# Patient Record
Sex: MALE | Employment: FULL TIME | URBAN - METROPOLITAN AREA
[De-identification: names, ages, dates, MRNs, and addresses within clinical notes are randomized per-mention and may not be internally consistent; named-entity substitution may affect disease eponyms.]

---

## 2023-07-25 ENCOUNTER — OFFICE VISIT (OUTPATIENT)
Age: 33
End: 2023-07-25
Payer: COMMERCIAL

## 2023-07-25 VITALS
HEIGHT: 69 IN | WEIGHT: 173 LBS | BODY MASS INDEX: 25.62 KG/M2 | HEART RATE: 96 BPM | OXYGEN SATURATION: 99 % | TEMPERATURE: 97.1 F | SYSTOLIC BLOOD PRESSURE: 106 MMHG | DIASTOLIC BLOOD PRESSURE: 74 MMHG | RESPIRATION RATE: 16 BRPM

## 2023-07-25 DIAGNOSIS — B36.0 TINEA VERSICOLOR: ICD-10-CM

## 2023-07-25 DIAGNOSIS — Z00.00 WELL ADULT EXAM: Primary | ICD-10-CM

## 2023-07-25 DIAGNOSIS — K51.919 ULCERATIVE COLITIS WITH COMPLICATION, UNSPECIFIED LOCATION (HCC): ICD-10-CM

## 2023-07-25 DIAGNOSIS — Z11.59 ENCOUNTER FOR HEPATITIS C SCREENING TEST FOR LOW RISK PATIENT: ICD-10-CM

## 2023-07-25 DIAGNOSIS — Z11.4 ENCOUNTER FOR SCREENING FOR HIV: ICD-10-CM

## 2023-07-25 PROBLEM — K51.90 ULCERATIVE COLITIS (HCC): Status: ACTIVE | Noted: 2023-07-25

## 2023-07-25 PROCEDURE — 99385 PREV VISIT NEW AGE 18-39: CPT | Performed by: STUDENT IN AN ORGANIZED HEALTH CARE EDUCATION/TRAINING PROGRAM

## 2023-07-25 PROCEDURE — 99203 OFFICE O/P NEW LOW 30 MIN: CPT | Performed by: STUDENT IN AN ORGANIZED HEALTH CARE EDUCATION/TRAINING PROGRAM

## 2023-07-25 RX ORDER — PREDNISONE 50 MG/1
50 TABLET ORAL DAILY
Qty: 5 TABLET | Refills: 0 | Status: SHIPPED | OUTPATIENT
Start: 2023-07-25 | End: 2023-07-30

## 2023-07-25 RX ORDER — KETOCONAZOLE 20 MG/ML
SHAMPOO TOPICAL
Qty: 120 ML | Refills: 1 | Status: SHIPPED | OUTPATIENT
Start: 2023-07-25

## 2023-07-25 SDOH — ECONOMIC STABILITY: HOUSING INSECURITY
IN THE LAST 12 MONTHS, WAS THERE A TIME WHEN YOU DID NOT HAVE A STEADY PLACE TO SLEEP OR SLEPT IN A SHELTER (INCLUDING NOW)?: NO

## 2023-07-25 SDOH — ECONOMIC STABILITY: INCOME INSECURITY: HOW HARD IS IT FOR YOU TO PAY FOR THE VERY BASICS LIKE FOOD, HOUSING, MEDICAL CARE, AND HEATING?: NOT HARD AT ALL

## 2023-07-25 SDOH — ECONOMIC STABILITY: FOOD INSECURITY: WITHIN THE PAST 12 MONTHS, THE FOOD YOU BOUGHT JUST DIDN'T LAST AND YOU DIDN'T HAVE MONEY TO GET MORE.: NEVER TRUE

## 2023-07-25 SDOH — ECONOMIC STABILITY: FOOD INSECURITY: WITHIN THE PAST 12 MONTHS, YOU WORRIED THAT YOUR FOOD WOULD RUN OUT BEFORE YOU GOT MONEY TO BUY MORE.: NEVER TRUE

## 2023-07-25 ASSESSMENT — ENCOUNTER SYMPTOMS
PHOTOPHOBIA: 0
TROUBLE SWALLOWING: 0
DIARRHEA: 1
VOMITING: 0
WHEEZING: 0
BACK PAIN: 0
BLOOD IN STOOL: 0
SORE THROAT: 0
ABDOMINAL PAIN: 0
COLOR CHANGE: 1
SHORTNESS OF BREATH: 0
RHINORRHEA: 0
EYE DISCHARGE: 0
EYE ITCHING: 0
VOICE CHANGE: 0
NAUSEA: 0
CONSTIPATION: 0
COUGH: 0

## 2023-07-25 ASSESSMENT — PATIENT HEALTH QUESTIONNAIRE - PHQ9
SUM OF ALL RESPONSES TO PHQ QUESTIONS 1-9: 0
1. LITTLE INTEREST OR PLEASURE IN DOING THINGS: 0
SUM OF ALL RESPONSES TO PHQ QUESTIONS 1-9: 0
SUM OF ALL RESPONSES TO PHQ QUESTIONS 1-9: 0
SUM OF ALL RESPONSES TO PHQ9 QUESTIONS 1 & 2: 0
SUM OF ALL RESPONSES TO PHQ QUESTIONS 1-9: 0
2. FEELING DOWN, DEPRESSED OR HOPELESS: 0

## 2023-07-25 NOTE — PROGRESS NOTES
Chief Complaint   Patient presents with    Establish Care    Skin Problem     Discoloration to neck and shoulders bilaterally         1. \"Have you been to the ER, urgent care clinic since your last visit? Hospitalized since your last visit? \" No    2. \"Have you seen or consulted any other health care providers outside of the [address] [place] [address] since your last visit? \" No     3. For patients aged 43-73: Has the patient had a colonoscopy / FIT/ Cologuard?  No

## 2023-07-25 NOTE — PROGRESS NOTES
History and Physical      Dereje Broderick  YOB: 1990    Date of Service:  7/25/2023    Chief Complaint:   Lisa Orozco is a 28 y.o. male who presents for complete physical examination. HPI: Pleasant 77-year-old gentleman with a past medical history of ulcerative colitis who presents today to establish care and for CPE. Patient states he recently relocated from [place]. Patient endorses a prior history of substance use disorder. He states, that his relocation to [place] was in furtherance of breaking away from his prior use of illicit substances while in [place]. Patient also states he is scheduled to establish with a gastroenterologist (ulcerative colitis) with his upcoming appointment on 8/4/2024. Patient also complains of skin discoloration and itchiness on his neck, back and shoulder since May. Patient otherwise denies any acute medical issues. Wt Readings from Last 3 Encounters:   07/25/23 173 lb (78.5 kg)     BP Readings from Last 3 Encounters:   07/25/23 106/74       Patient Active Problem List   Diagnosis    Ulcerative colitis ([address])       No Known Allergies  Outpatient Medications Marked as Taking for the 7/25/23 encounter (Office Visit) with Sky Funes DO   Medication Sig Dispense Refill    PREDNISONE, ABDIEL, PO Take by mouth 40 mg taper      ketoconazole (NIZORAL) 2 % shampoo Apply topically daily as needed. 120 mL 1    predniSONE (DELTASONE) 50 MG tablet Take 1 tablet by mouth daily for 5 days 5 tablet 0       Past Medical History:   Diagnosis Date    Ulcerative colitis ([address])      No past surgical history on file.   Family History   Family history unknown: Yes     Social History     Socioeconomic History    Marital status: Single     Spouse name: Not on file    Number of children: Not on file    Years of education: Not on file    Highest education level: Not on file   Occupational History    Not on file   Tobacco Use    Smoking status: Some Days

## 2023-08-17 ENCOUNTER — TELEPHONE (OUTPATIENT)
Age: 33
End: 2023-08-17

## 2023-08-17 NOTE — TELEPHONE ENCOUNTER
Patient requesting refill on Prednisone if possible, states appt with gastro is not until 8/31 and that his colitis is flaring up

## 2024-03-18 ENCOUNTER — OFFICE VISIT (OUTPATIENT)
Age: 34
End: 2024-03-18
Payer: COMMERCIAL

## 2024-03-18 VITALS
TEMPERATURE: 97.3 F | HEIGHT: 69 IN | HEART RATE: 77 BPM | SYSTOLIC BLOOD PRESSURE: 119 MMHG | OXYGEN SATURATION: 98 % | DIASTOLIC BLOOD PRESSURE: 84 MMHG | RESPIRATION RATE: 18 BRPM | WEIGHT: 194 LBS | BODY MASS INDEX: 28.73 KG/M2

## 2024-03-18 DIAGNOSIS — D17.21 LIPOMA OF RIGHT UPPER EXTREMITY: Primary | ICD-10-CM

## 2024-03-18 DIAGNOSIS — L29.9 ITCHING: ICD-10-CM

## 2024-03-18 PROCEDURE — 99213 OFFICE O/P EST LOW 20 MIN: CPT | Performed by: STUDENT IN AN ORGANIZED HEALTH CARE EDUCATION/TRAINING PROGRAM

## 2024-03-18 RX ORDER — HYDROXYZINE HYDROCHLORIDE 25 MG/1
25 TABLET, FILM COATED ORAL EVERY 8 HOURS PRN
Qty: 45 TABLET | Refills: 0 | Status: SHIPPED | OUTPATIENT
Start: 2024-03-18 | End: 2024-04-02

## 2024-03-18 RX ORDER — UPADACITINIB 30 MG/1
TABLET, EXTENDED RELEASE ORAL
COMMUNITY
Start: 2023-11-20

## 2024-03-18 ASSESSMENT — PATIENT HEALTH QUESTIONNAIRE - PHQ9
SUM OF ALL RESPONSES TO PHQ QUESTIONS 1-9: 0
SUM OF ALL RESPONSES TO PHQ QUESTIONS 1-9: 0
1. LITTLE INTEREST OR PLEASURE IN DOING THINGS: NOT AT ALL
2. FEELING DOWN, DEPRESSED OR HOPELESS: NOT AT ALL
SUM OF ALL RESPONSES TO PHQ QUESTIONS 1-9: 0
SUM OF ALL RESPONSES TO PHQ9 QUESTIONS 1 & 2: 0
SUM OF ALL RESPONSES TO PHQ QUESTIONS 1-9: 0

## 2024-03-18 ASSESSMENT — ENCOUNTER SYMPTOMS
EYE DISCHARGE: 0
SORE THROAT: 0
NAUSEA: 0
TROUBLE SWALLOWING: 0
VOICE CHANGE: 0
RHINORRHEA: 0
BACK PAIN: 0
SHORTNESS OF BREATH: 0
PHOTOPHOBIA: 0
DIARRHEA: 0
VOMITING: 0
COUGH: 0
WHEEZING: 0
EYE ITCHING: 0

## 2024-03-18 NOTE — PROGRESS NOTES
1. \"Have you been to the ER, urgent care clinic since your last visit?  Hospitalized since your last visit?\" No    2. \"Have you seen or consulted any other health care providers outside of the [facility] since your last visit?\" No     3. For patients aged 45-75: Has the patient had a colonoscopy / FIT/ Cologuard? NA - based on age

## 2024-03-18 NOTE — PROGRESS NOTES
Subjective:      Patient ID: Dereje Broderick is a 33 y.o. male.    Pleasant 33-year-old gentleman presenting today with complaints of multiple subcutaneous swelling located in the inner aspect of his left arm x 2 years.  Patient also complains of occasional/intermittent generalized itching.         Review of Systems   Constitutional:  Negative for activity change, appetite change, fatigue and fever.   HENT:  Negative for congestion, hearing loss, postnasal drip, rhinorrhea, sore throat, trouble swallowing and voice change.    Eyes:  Negative for photophobia, discharge, itching and visual disturbance.   Respiratory:  Negative for cough, shortness of breath and wheezing.    Cardiovascular:  Negative for chest pain, palpitations and leg swelling.   Gastrointestinal:  Negative for abdominal pain, constipation, diarrhea, nausea and vomiting.   Genitourinary:  Negative for difficulty urinating and dysuria.   Musculoskeletal:  Negative for arthralgias and back pain.   Skin:  Negative for rash.   Neurological:  Negative for dizziness, weakness, light-headedness and headaches.   Psychiatric/Behavioral:  Negative for sleep disturbance. The patient is not nervous/anxious.        Objective: /84 (Site: Left Upper Arm, Position: Sitting, Cuff Size: Medium Adult)   Pulse 77   Temp 97.3 °F (36.3 °C) (Temporal)   Resp 18   Ht 1.753 m (5' 9\")   Wt 88 kg (194 lb)   SpO2 98%   BMI 28.65 kg/m²      Physical Exam  Vitals reviewed.   Constitutional:       General: He is not in acute distress.     Appearance: Normal appearance. He is not ill-appearing, toxic-appearing or diaphoretic.   HENT:      Head: Normocephalic and atraumatic.      Right Ear: External ear normal.      Left Ear: External ear normal.   Eyes:      General:         Right eye: No discharge.         Left eye: No discharge.   Cardiovascular:      Rate and Rhythm: Normal rate and regular rhythm.      Pulses: Normal pulses.      Heart sounds: Normal heart

## 2024-03-25 ENCOUNTER — OFFICE VISIT (OUTPATIENT)
Age: 34
End: 2024-03-25
Payer: COMMERCIAL

## 2024-03-25 VITALS
DIASTOLIC BLOOD PRESSURE: 84 MMHG | HEART RATE: 80 BPM | BODY MASS INDEX: 28.14 KG/M2 | TEMPERATURE: 98 F | SYSTOLIC BLOOD PRESSURE: 130 MMHG | OXYGEN SATURATION: 98 % | WEIGHT: 190 LBS | HEIGHT: 69 IN

## 2024-03-25 DIAGNOSIS — M79.89 SOFT TISSUE MASS: Primary | ICD-10-CM

## 2024-03-25 DIAGNOSIS — R22.32 ARM MASS, LEFT: ICD-10-CM

## 2024-03-25 DIAGNOSIS — R22.31 ARM MASS, RIGHT: ICD-10-CM

## 2024-03-25 PROCEDURE — 99204 OFFICE O/P NEW MOD 45 MIN: CPT | Performed by: SURGERY

## 2024-03-25 NOTE — PROGRESS NOTES
General Surgery Consult    Dereje Broderick  Admit date: (Not on file)    MRN: [id number]     : 1990     Age: 33 y.o.        Attending Physician: Beka Rondon MD [facility]      History of Present Illness:      Dereje Broderick is a 33 y.o. male who was referred to me by Dr. Sales for evaluation of soft tissue masses on the left upper extremity.  The patient stated that on his arm has been having these masses for more than 8 to 10 years but they are still bothering him a little bit and he would like them to be removed.  He also noticed a 1 on the right arm that has been present for a year or so he also would like it to be excised as well.     Patient Active Problem List    Diagnosis Date Noted    Ulcerative colitis (HCC) 2023     Past Medical History:   Diagnosis Date    Ulcerative colitis (HCC)       No past surgical history on file.   Social History     Tobacco Use    Smoking status: Some Days     Average packs/day: 0.2 packs/day for 9.2 years (2.0 ttl pk-yrs)     Types: Cigarettes     Start date:      Last attempt to quit:      Years since quittin.2     Passive exposure: Never    Smokeless tobacco: Never   Substance Use Topics    Alcohol use: Yes     Comment: occasionally      Social History     Tobacco Use   Smoking Status Some Days    Average packs/day: 0.2 packs/day for 9.2 years (2.0 ttl pk-yrs)    Types: Cigarettes    Start date:     Last attempt to quit:     Years since quittin.2    Passive exposure: Never   Smokeless Tobacco Never     Family History   Family history unknown: Yes      Current Outpatient Medications   Medication Sig    RINVOQ 30 MG TB24 1 tab(s) orally once a day to start after completing the eight weeks of the Rinvoq 45 mg for 90 days    hydrOXYzine HCl (ATARAX) 25 MG tablet Take 1 tablet by mouth every 8 hours as needed for Itching (Patient not taking: Reported on 3/25/2024)    PREDNISONE, ABDIEL, PO Take by mouth 40 mg taper (Patient not

## 2024-03-25 NOTE — PROGRESS NOTES
Dereje Broderick is a 33 y.o. male (: 1990) presenting to address:    Chief Complaint   Patient presents with    New Patient     Lipoma upper L extremity       Medication list and allergies have been reviewed with Dereje Broderick and updated as of today's date.     I have gone over all Medical, Surgical and Social History with Dereje Broderick and updated/added the information accordingly.

## 2024-03-26 ENCOUNTER — TELEPHONE (OUTPATIENT)
Age: 34
End: 2024-03-26

## 2024-03-26 NOTE — TELEPHONE ENCOUNTER
Spoke to Mr. Broderick to schedule surgery ( excision of multiple soft tissue tumors from the left and right arms) on Friday, April 12, 2024 with Dr. Rondon at [facility].

## 2024-04-26 ENCOUNTER — TELEPHONE (OUTPATIENT)
Age: 34
End: 2024-04-26

## 2024-04-26 NOTE — TELEPHONE ENCOUNTER
Spoke to Mr. Broderick to inform per Dr. Acuna at [facility] it's recommended that he hold the rinvoq x 1 week prior to surgery and resume after surgery as surgeon deemed safe to do so. Mr. Broderick verbalized understanding and verify surgery is scheduled for Friday, May 10th due to his current out of state plans in [place]. Verified surgery was rescheduled as requested.

## 2024-05-03 ENCOUNTER — TELEPHONE (OUTPATIENT)
Age: 34
End: 2024-05-03

## 2024-05-03 NOTE — TELEPHONE ENCOUNTER
Left a voicemail message for Mr. Broderick responding to his inquiring regarding the surgery time and informing he want to confirm the surgery time because he need an afternoon surgery time because he has a lot going on at work right now and may need to reschedule the surgery again.  RE: surgery scheduled at 12:30pm with a 1 1/2 - 2 hr early check in.

## 2024-05-06 ENCOUNTER — TELEPHONE (OUTPATIENT)
Age: 34
End: 2024-05-06

## 2024-05-06 NOTE — TELEPHONE ENCOUNTER
Mr. Broderick called requesting to reschedule his surgery to Friday, May 31, 2024 due to his work schedule/busy schedule and out of town plans ([place]).  Surgery rescheduled as requested.

## 2024-05-22 ENCOUNTER — TELEPHONE (OUTPATIENT)
Age: 34
End: 2024-05-22

## 2024-05-22 NOTE — TELEPHONE ENCOUNTER
[facility] called requesting to reschedule May 31, 2024 surgery because right now it's just not a good time financially to have the surgery.  Surgery rescheduled as requested to Friday, June 14, 2024.

## 2025-01-23 ENCOUNTER — TELEPHONE (OUTPATIENT)
Facility: CLINIC | Age: 35
End: 2025-01-23

## 2025-01-23 NOTE — TELEPHONE ENCOUNTER
Tried reaching out to patient to schedule overdue f/u appt with provider, mailbox is full unable to leave vm.